# Patient Record
Sex: FEMALE | Race: WHITE | NOT HISPANIC OR LATINO | Employment: FULL TIME | URBAN - METROPOLITAN AREA
[De-identification: names, ages, dates, MRNs, and addresses within clinical notes are randomized per-mention and may not be internally consistent; named-entity substitution may affect disease eponyms.]

---

## 2022-07-06 ENCOUNTER — LAB REQUISITION (OUTPATIENT)
Dept: LAB | Facility: CLINIC | Age: 42
End: 2022-07-06

## 2022-07-06 LAB
VZV IGG SER QL IA: 562 INDEX
VZV IGG SER QL IA: POSITIVE

## 2022-07-06 PROCEDURE — 86787 VARICELLA-ZOSTER ANTIBODY: CPT | Performed by: INTERNAL MEDICINE

## 2022-07-06 PROCEDURE — 86481 TB AG RESPONSE T-CELL SUSP: CPT | Performed by: INTERNAL MEDICINE

## 2022-07-07 LAB
GAMMA INTERFERON BACKGROUND BLD IA-ACNC: 0 IU/ML
M TB IFN-G BLD-IMP: POSITIVE
M TB IFN-G CD4+ BCKGRND COR BLD-ACNC: 10 IU/ML
MITOGEN IGNF BCKGRD COR BLD-ACNC: 0.39 IU/ML
MITOGEN IGNF BCKGRD COR BLD-ACNC: 0.5 IU/ML
QUANTIFERON MITOGEN: 10 IU/ML
QUANTIFERON NIL TUBE: 0 IU/ML
QUANTIFERON TB1 TUBE: 0.5 IU/ML
QUANTIFERON TB2 TUBE: 0.39

## 2022-07-11 ENCOUNTER — HOSPITAL ENCOUNTER (OUTPATIENT)
Dept: GENERAL RADIOLOGY | Facility: CLINIC | Age: 42
Discharge: HOME | End: 2022-07-11
Attending: INTERNAL MEDICINE

## 2022-07-11 DIAGNOSIS — R76.12 POSITIVE QUANTIFERON-TB GOLD TEST: ICD-10-CM

## 2022-07-11 PROCEDURE — 999N000028 XR CHEST 1 VIEW, EMPLOYEE HEALTH

## 2023-01-25 ENCOUNTER — OFFICE VISIT (OUTPATIENT)
Dept: INTERNAL MEDICINE | Facility: CLINIC | Age: 43
End: 2023-01-25
Payer: COMMERCIAL

## 2023-01-25 VITALS
SYSTOLIC BLOOD PRESSURE: 124 MMHG | DIASTOLIC BLOOD PRESSURE: 70 MMHG | TEMPERATURE: 97.3 F | HEART RATE: 80 BPM | BODY MASS INDEX: 22.4 KG/M2 | OXYGEN SATURATION: 97 % | HEIGHT: 71 IN | WEIGHT: 160 LBS

## 2023-01-25 DIAGNOSIS — Z11.59 NEED FOR HEPATITIS C SCREENING TEST: ICD-10-CM

## 2023-01-25 DIAGNOSIS — Z11.4 SCREENING FOR HIV (HUMAN IMMUNODEFICIENCY VIRUS): ICD-10-CM

## 2023-01-25 DIAGNOSIS — G43.109 MIGRAINE WITH AURA AND WITHOUT STATUS MIGRAINOSUS, NOT INTRACTABLE: Primary | ICD-10-CM

## 2023-01-25 DIAGNOSIS — Z12.4 CERVICAL CANCER SCREENING: ICD-10-CM

## 2023-01-25 PROCEDURE — 99204 OFFICE O/P NEW MOD 45 MIN: CPT

## 2023-01-25 RX ORDER — ACETAMINOPHEN AND CODEINE PHOSPHATE 120; 12 MG/5ML; MG/5ML
0.35 SOLUTION ORAL DAILY
COMMUNITY
Start: 2023-01-25 | End: 2023-02-09

## 2023-01-25 RX ORDER — SUMATRIPTAN 20 MG/1
1 SPRAY NASAL PRN
Qty: 1 EACH | Refills: 3 | Status: SHIPPED | OUTPATIENT
Start: 2023-01-25

## 2023-01-25 RX ORDER — ONDANSETRON 4 MG/1
4 TABLET, ORALLY DISINTEGRATING ORAL EVERY 8 HOURS PRN
Qty: 12 TABLET | Refills: 0 | Status: SHIPPED | OUTPATIENT
Start: 2023-01-25

## 2023-01-25 ASSESSMENT — ENCOUNTER SYMPTOMS: HEADACHES: 1

## 2023-01-25 NOTE — PATIENT INSTRUCTIONS
Follow up with neurology.     Imitrex nasal spray sent.     FMLA is paperwork that I would need from your work.

## 2023-01-25 NOTE — PROGRESS NOTES
"  Assessment & Plan     Migraine with aura and without status migrainosus, not intractable  Patient has migraine with aura for past 20 years per her report.  She denies change to these migraines return.  She is able to tolerate a migraine coming when she has an aura and 15 minutes later she is extremely nauseous and is \"down for the day \".  I recommend intranasal Imitrex as migraines are frequently associated with severe nausea, I also sent prescription for Zofran disintegrating tablets.  She is working to get [facility] paperwork filled out and will send it to our office when able    Patient referred to neurology as well  - Adult Neurology  Referral; Future  - ondansetron (ZOFRAN ODT) 4 MG ODT tab; Take 1 tablet (4 mg) by mouth every 8 hours as needed for nausea (nausea)  - SUMAtriptan (IMITREX) 20 MG/ACT nasal spray; Spray 1 spray in nostril as needed for migraine May repeat in 2 hours. Max 2 sprays/24 hours.    Return in about 2 weeks (around 2/8/2023) for Routine preventive.    Basilio Gomes NP  [facility] ALLAN Hills is a 42 year old, presenting for the following health issues:  Establish Care and Headache    Patient  Has  Appointment  For pap with GYN.    Headache     History of Present Illness       Headaches:   Since the patient's last clinic visit, headaches are: worsened  The patient is getting headaches:  About every two months  She is not able to do normal daily activities when she has a migraine.  The patient is taking the following rescue/relief medications:  Tylenol and Excedrin   Patient states \"The relief is inconsistent\" from the rescue/relief medications.   The patient is taking the following medications to prevent migraines:  No medications to prevent migraines  In the past 4 weeks, the patient has gone to an Urgent Care or Emergency Room 0 times times due to headaches.    She eats 2-3 servings of fruits and vegetables daily.She consumes 0 sweetened " "beverage(s) daily.She exercises with enough effort to increase her heart rate 20 to 29 minutes per day.  She exercises with enough effort to increase her heart rate 5 days per week.   She is taking medications regularly.     Migraine are infrequent- (once every two to three months). Depends on how close they take medication. Medication does not necessarily aleve pain but it can help lessen severity. Patients migraines are caused by a trigger migraines. May be stress related.     Has been less frequent since stopping drinking coffee.. Migraines are now coming in clusters- More frequently     Has one every two of three months.     Interested in nasal spray      For work patient is a [employer] typing, talking on the phone, critical thinking, interacting with people     This condition started around May 2000    Review of Systems   Neurological: Positive for headaches.      Constitutional, HEENT, cardiovascular, pulmonary, GI, , musculoskeletal, neuro, skin, endocrine and psych systems are negative, except as otherwise noted.      Objective    /70   Pulse 80   Temp 97.3  F (36.3  C) (Oral)   Ht 1.803 m (5' 11\")   Wt 72.6 kg (160 lb)   LMP 01/11/2023   SpO2 97%   Breastfeeding No   BMI 22.32 kg/m    Body mass index is 22.32 kg/m .  Physical Exam   GENERAL: alert and no distress  EYES: Eyes grossly normal to inspection, PERRL and conjunctivae and sclerae normal  RESP: lungs clear to auscultation - no rales, rhonchi or wheezes  CV: regular rate and rhythm, normal S1 S2, no S3 or S4, no murmur, click or rub, no peripheral edema and peripheral pulses strong  ABDOMEN: soft, nontender, no hepatosplenomegaly, no masses and bowel sounds normal  MS: no gross musculoskeletal defects noted, no edema  SKIN: no suspicious lesions or rashes  NEURO: Normal strength and tone, mentation intact and speech normal  PSYCH: mentation appears normal, affect normal/bright    "

## 2023-02-09 ENCOUNTER — OFFICE VISIT (OUTPATIENT)
Dept: OBGYN | Facility: CLINIC | Age: 43
End: 2023-02-09
Payer: COMMERCIAL

## 2023-02-09 VITALS
DIASTOLIC BLOOD PRESSURE: 72 MMHG | BODY MASS INDEX: 22.34 KG/M2 | WEIGHT: 159.6 LBS | SYSTOLIC BLOOD PRESSURE: 120 MMHG | HEIGHT: 71 IN

## 2023-02-09 DIAGNOSIS — Z01.419 WOMEN'S ANNUAL ROUTINE GYNECOLOGICAL EXAMINATION: Primary | ICD-10-CM

## 2023-02-09 PROCEDURE — 99386 PREV VISIT NEW AGE 40-64: CPT | Performed by: OBSTETRICS & GYNECOLOGY

## 2023-02-09 RX ORDER — ACETAMINOPHEN AND CODEINE PHOSPHATE 120; 12 MG/5ML; MG/5ML
0.35 SOLUTION ORAL DAILY
Qty: 84 TABLET | Refills: 3 | Status: SHIPPED | OUTPATIENT
Start: 2023-02-09 | End: 2024-02-19

## 2023-02-09 NOTE — NURSING NOTE
"Chief Complaint   Patient presents with     Physical     Last pap was 19- NIL with negative HPV. No questions or concerns     Refill Request     On OCP, working well for her. No concerns       Initial /72   Ht 1.803 m (5' 11\")   Wt 72.4 kg (159 lb 9.6 oz)   LMP 2023   BMI 22.26 kg/m   Estimated body mass index is 22.26 kg/m  as calculated from the following:    Height as of this encounter: 1.803 m (5' 11\").    Weight as of this encounter: 72.4 kg (159 lb 9.6 oz).  BP completed using cuff size: regular    Questioned patient about current smoking habits.  Pt. has never smoked.          The following HM Due: mammogram      Joseph Collins CMA                 "

## 2023-02-09 NOTE — PROGRESS NOTES
SUBJECTIVE:                                                      Jeana is a 42 year old No obstetric history on file. female who presents for annual exam.     Patient's last menstrual period was 2023.. Menses are regular q 28-30 days and normal lasting 3-5 days.  Using oral contraceptives for contraception.  She is not currently considering pregnancy.  Besides routine health maintenance, she has no other health concerns today .  GYNECOLOGIC HISTORY:    History sexually transmitted infections:No STD history    History of abnormal Pap smear: NO - age 30-65 PAP every 5 years with negative HPV co-testing recommended  Family history of breast CA: No  Family history of uterine/ovarian CA: No    Family history of colon CA: Yes (Please explain): mat GF, 70s, surgery but no further treatment needed      HISTORY:  OB History    Para Term  AB Living   0 0 0 0 0 0   SAB IAB Ectopic Multiple Live Births   0 0 0 0 0     Past Medical History:   Diagnosis Date     Migraine with aura      Past Surgical History:   Procedure Laterality Date     NO HISTORY OF SURGERY       Family History   Problem Relation Age of Onset     Hypertension Mother      Thyroid nodules Mother      Hypertension Father      Cerebrovascular Disease Maternal Grandmother      Colon Cancer Maternal Grandfather      Diabetes Paternal Grandmother      Cerebrovascular Disease Paternal Grandfather      No Known Problems Brother      Social History     Socioeconomic History     Marital status: Single   Tobacco Use     Smoking status: Never     Smokeless tobacco: Never   Vaping Use     Vaping Use: Never used   Substance and Sexual Activity     Alcohol use: No       Current Outpatient Medications:      norethindrone (MICRONOR) 0.35 MG tablet, Take 1 tablet (0.35 mg) by mouth daily, Disp: 84 tablet, Rfl: 3     ondansetron (ZOFRAN ODT) 4 MG ODT tab, Take 1 tablet (4 mg) by mouth every 8 hours as needed for nausea (nausea), Disp: 12 tablet, Rfl: 0      "SUMAtriptan (IMITREX) 20 MG/ACT nasal spray, Spray 1 spray in nostril as needed for migraine May repeat in 2 hours. Max 2 sprays/24 hours., Disp: 1 each, Rfl: 3     Allergies   Allergen Reactions     No Known Drug Allergies        Past medical, surgical, social and family history were reviewed and updated in EPIC.    ROS:   Negative other than as noted above.      OBJECTIVE:                                                      EXAM:  /72   Ht 1.803 m (5' 11\")   Wt 72.4 kg (159 lb 9.6 oz)   LMP 01/30/2023   BMI 22.26 kg/m     BMI: Body mass index is 22.26 kg/m .  General: Alert and oriented, no distress.  Psychiatric: Mood and affect within normal limits.  Skin: Warm and dry, no lesions, rashes or discolorations.  Neck: Neck supple. Thyroid palpbably normal in size and without nodularity.  Cardiovascular: Regular rate and rhythm, no murmurs, rubs or gallops.   Lungs:  Clear to auscultation bilaterally, breathing is unlabored.  Breasts:  Symmetric, no skin changes.  No dominant masses bilaterally.  The left nipple is inverted, and she states that this has been the case \"for as long as she can remember.\"  Lymph:  No cervical, supraclavicular, infraclavicular, axillary or inguinal lymphadenopathy palpable.   Abdomen: Soft, nontender, no hepatosplenomegaly, no rebound or guarding, no masses, no hernias.   Vulva:  No external lesions, normal female hair distribution, no inguinal adenopathy.    Urethra:  Midline, non-tender, well supported, no discharge  Vagina:  Well-estrogenized, no abnormal discharge, no lesions  Cervix: nontender  Uterus:  anteverted, smooth contour, without enlargement, mobile, and without tenderness  Ovaries:  No masses appreciated, non-tender, mobile  Rectal Exam: deferred  Musculoskeletal: extremities normal      COUNSELING:   Reviewed preventive health counseling, as reflected in patient instructions       Regular exercise       Healthy diet/nutrition       Contraception   reports that " she has never smoked. She has never used smokeless tobacco.        ASSESSMENT/PLAN:                                                      42 year old female with satisfactory annual exam  (Z01.419) Women's annual routine gynecological examination  (primary encounter diagnosis)  Comment:   Plan: *MA Screening Digital Bilateral, norethindrone         (MICRONOR) 0.35 MG tablet        We discussed that combination oral contraceptives should be avoided given her migraine with aura, and so prescription for progesterone only contraceptives was renewed.  Mammogram ordered, breast exam is normal.  She should follow-up yearly, and Pap smear with HPV is recommended for next year.      Olive Mcfarlane MD

## 2023-02-15 ENCOUNTER — HOSPITAL ENCOUNTER (OUTPATIENT)
Dept: MAMMOGRAPHY | Facility: CLINIC | Age: 43
Discharge: HOME | End: 2023-02-15
Attending: OBSTETRICS & GYNECOLOGY | Admitting: OBSTETRICS & GYNECOLOGY
Payer: COMMERCIAL

## 2023-02-15 DIAGNOSIS — Z01.419 WOMEN'S ANNUAL ROUTINE GYNECOLOGICAL EXAMINATION: ICD-10-CM

## 2023-02-15 PROCEDURE — 77067 SCR MAMMO BI INCL CAD: CPT

## 2023-07-26 ENCOUNTER — TELEPHONE (OUTPATIENT)
Dept: NEUROLOGY | Facility: CLINIC | Age: 43
End: 2023-07-26
Payer: COMMERCIAL

## 2023-07-26 NOTE — TELEPHONE ENCOUNTER
Left message for patient to call and confirm appt for 7-27-23.  Also noted that schedule was adjusted to 10:30 as Dr. Novak was overbooked.

## 2023-07-27 ENCOUNTER — OFFICE VISIT (OUTPATIENT)
Dept: NEUROLOGY | Facility: CLINIC | Age: 43
End: 2023-07-27
Payer: COMMERCIAL

## 2023-07-27 VITALS
SYSTOLIC BLOOD PRESSURE: 124 MMHG | HEART RATE: 79 BPM | BODY MASS INDEX: 23.01 KG/M2 | WEIGHT: 165 LBS | OXYGEN SATURATION: 96 % | DIASTOLIC BLOOD PRESSURE: 80 MMHG

## 2023-07-27 DIAGNOSIS — G43.109 MIGRAINE WITH AURA AND WITHOUT STATUS MIGRAINOSUS, NOT INTRACTABLE: ICD-10-CM

## 2023-07-27 PROCEDURE — 99204 OFFICE O/P NEW MOD 45 MIN: CPT | Performed by: PSYCHIATRY & NEUROLOGY

## 2023-07-27 ASSESSMENT — HEADACHE IMPACT TEST (HIT 6)
HIT6 TOTAL SCORE: 63
HOW OFTEN DO HEADACHES LIMIT YOUR DAILY ACTIVITIES: SOMETIMES
HOW OFTEN HAVE YOU FELT TOO TIRED TO WORK BECAUSE OF YOUR HEADACHES: SOMETIMES
HOW OFTEN DID HEADACHS LIMIT CONCENTRATION ON WORK OR DAILY ACTIVITY: SOMETIMES
WHEN YOU HAVE A HEADACHE HOW OFTEN DO YOU WISH YOU COULD LIE DOWN: ALWAYS
HOW OFTEN HAVE YOU FELT FED UP OR IRRITATED BECAUSE OF YOUR HEADACHES: SOMETIMES
WHEN YOU HAVE HEADACHES HOW OFTEN IS THE PAIN SEVERE: SOMETIMES

## 2023-07-27 NOTE — PROGRESS NOTES
[facility]    Jeana Carrillo MRN# [id number]   Age: 43 year old YOB: 1980     Requesting physician: Basilio Gomes  No Ref-Primary, Physician            Assessment and Plan:   Assessment:   Classic Migraine - not intractable     Plan:   MRI brain as all of her headaches are in the same unique location and need to r/o vascular anomaly  Proceed with trial of Imitrex nasal spray at onset and Zofran  RTC 9/21 for review of imaging and medication response    Jeana describes classic migraine.  Since all of her episodes are uniquely in the same location, there is a remote possibility that there is some vascular intracranial pathology mimicking migraine which will be reviewed with MR imaging.  I will meet with her to disclose and review the study per her request.  I believe that the nasal Imitrex and Zofran as recommended by Mr. Kuhn is appropriate.  She has not had a chance since this was prescribed to try it as she did not have the medicine with her for the only headache she did have between then and now.  She will call for any other concerns.             History of Present Illness:   CC:  I met with Jeana today for 45 minutes to review her issues with migraine.  They have been going on since calendar year 2020 and over that span she has suffered perhaps several migraine events per year.  They are classical migraine preceded by short prodrome of feeling some disruption in her mood and activities for several hours followed by visual aura of flashing lights for 10 to 15 minutes and then followed by severe right-sided unilateral pounding headache associated with nausea, vomiting, photophobia and sonophobia.  She is unable to obtain any relief from the severe headache with over-the-counter agents and has resorted to trying to get some sleep at which time the headache may chad but she is left with some post headache residuals of fatigue.  She has modified her diet and is limited to the  only offender she could find that being coffee with some limited success.  She has been taking magnesium supplements without benefit and because of the severe nausea and vomiting she has been unable to hold on any vasoconstrictors such as Imitrex tablets.  She has converted her oral contraceptives to low estrogen again without benefit.  She has been unable to identify any triggers to her migraine other than coffee and has not been able to find any remedies.  She has never had imaging done.    Between headaches she is symptom-free.  Her neurologic review of systems is completely noncontributory and her medical health is excellent.    She describes himself as a type A individual and finds that high stress contributes to her headache not that she has difficulties during the stress but after the stress he will emerge.  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  She exercises regularly, and drinks only occasionally.               Physical Exam:   The neurological examination is completely normal upon review of her mental status, cranial nerve, motor, sensory, coordination station gait, and reflexes.  The fundi are benign.  She has no venous pulsations, AV nicking, and no hemorrhages.  The discs are sharp.         Data:   All laboratory data reviewed  All imaging studies reviewed by me             DATA for DOCUMENTATION:         Past Medical History:     Patient Active Problem List   Diagnosis    CARDIOVASCULAR SCREENING; LDL GOAL LESS THAN 160     Past Medical History:   Diagnosis Date    Migraine with aura        Also see scanned health assessment forms.       Past Surgical History:     Past Surgical History:   Procedure Laterality Date    NO HISTORY OF SURGERY              Social History:     Social History     Socioeconomic History    Marital status: Single     Spouse name: Not on file    Number of children: Not on file    Years of education: Not on file    Highest  education level: Not on file   Occupational History    Not on file   Tobacco Use    Smoking status: Never    Smokeless tobacco: Never   Vaping Use    Vaping Use: Never used   Substance and Sexual Activity    Alcohol use: No    Drug use: Never    Sexual activity: Yes     Partners: Male     Birth control/protection: Pill   Other Topics Concern    Not on file   Social History Narrative    Not on file     Social Determinants of Health     Financial Resource Strain: Not on file   Food Insecurity: Not on file   Transportation Needs: Not on file   Physical Activity: Not on file   Stress: Not on file   Social Connections: Not on file   Intimate Partner Violence: Not on file   Housing Stability: Not on file              Family History:     Family History   Problem Relation Age of Onset    Hypertension Mother     Thyroid nodules Mother     Hypertension Father     Cerebrovascular Disease Maternal Grandmother     Colon Cancer Maternal Grandfather     Diabetes Paternal Grandmother     Cerebrovascular Disease Paternal Grandfather     No Known Problems Brother             Medications:     Current Outpatient Medications   Medication Sig    norethindrone (MICRONOR) 0.35 MG tablet Take 1 tablet (0.35 mg) by mouth daily    ondansetron (ZOFRAN ODT) 4 MG ODT tab Take 1 tablet (4 mg) by mouth every 8 hours as needed for nausea (nausea)    SUMAtriptan (IMITREX) 20 MG/ACT nasal spray Spray 1 spray in nostril as needed for migraine May repeat in 2 hours. Max 2 sprays/24 hours.     No current facility-administered medications for this visit.              Review of Systems:   A comprehensive 10 point review of systems (constitutional, ENT, cardiac, peripheral vascular, lymphatic, respiratory, GI, , Musculoskeletal, skin, Neurological) was performed and found to be negative except as described in this note.     See intake form completed by patient

## 2023-07-27 NOTE — NURSING NOTE
"Jeana Carrillo is a 43 year old female who presents for:  Chief Complaint   Patient presents with    Referral     Migraine        Initial Vitals:  /80   Pulse 79   Wt 74.8 kg (165 lb)   SpO2 96%   BMI 23.01 kg/m   Estimated body mass index is 23.01 kg/m  as calculated from the following:    Height as of 2/9/23: 1.803 m (5' 11\").    Weight as of this encounter: 74.8 kg (165 lb).. Body surface area is 1.94 meters squared. BP completed using cuff size: dakota Colunga    "

## 2023-07-27 NOTE — LETTER
7/27/2023         RE: Jeana Carrillo  [address] [facility] [address]        Dear Colleague,    Thank you for referring your patient, Jeana Carrillo, to the [facility]. Please see a copy of my visit note below.        [facility]    Jeana Carrillo MRN# [id number]   Age: 43 year old YOB: 1980     Requesting physician: Basilio Gomes  No Ref-Primary, Physician            Assessment and Plan:   Assessment:   Classic Migraine - not intractable     Plan:   MRI brain as all of her headaches are in the same unique location and need to r/o vascular anomaly  Proceed with trial of Imitrex nasal spray at onset and Zofran  RTC 9/21 for review of imaging and medication response    Jeana describes classic migraine.  Since all of her episodes are uniquely in the same location, there is a remote possibility that there is some vascular intracranial pathology mimicking migraine which will be reviewed with MR imaging.  I will meet with her to disclose and review the study per her request.  I believe that the nasal Imitrex and Zofran as recommended by Mr. Kuhn is appropriate.  She has not had a chance since this was prescribed to try it as she did not have the medicine with her for the only headache she did have between then and now.  She will call for any other concerns.             History of Present Illness:   CC:  I met with Jeana today for 45 minutes to review her issues with migraine.  They have been going on since calendar year 2020 and over that span she has suffered perhaps several migraine events per year.  They are classical migraine preceded by short prodrome of feeling some disruption in her mood and activities for several hours followed by visual aura of flashing lights for 10 to 15 minutes and then followed by severe right-sided unilateral pounding headache associated with nausea, vomiting, photophobia and sonophobia.  She is unable to  obtain any relief from the severe headache with over-the-counter agents and has resorted to trying to get some sleep at which time the headache may chad but she is left with some post headache residuals of fatigue.  She has modified her diet and is limited to the only offender she could find that being coffee with some limited success.  She has been taking magnesium supplements without benefit and because of the severe nausea and vomiting she has been unable to hold on any vasoconstrictors such as Imitrex tablets.  She has converted her oral contraceptives to low estrogen again without benefit.  She has been unable to identify any triggers to her migraine other than coffee and has not been able to find any remedies.  She has never had imaging done.    Between headaches she is symptom-free.  Her neurologic review of systems is completely noncontributory and her medical health is excellent.    She describes himself as a type A individual and finds that high stress contributes to her headache not that she has difficulties during the stress but after the stress he will emerge.  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  She exercises regularly, and drinks only occasionally.               Physical Exam:   The neurological examination is completely normal upon review of her mental status, cranial nerve, motor, sensory, coordination station gait, and reflexes.  The fundi are benign.  She has no venous pulsations, AV nicking, and no hemorrhages.  The discs are sharp.         Data:   All laboratory data reviewed  All imaging studies reviewed by me             DATA for DOCUMENTATION:         Past Medical History:     Patient Active Problem List   Diagnosis     CARDIOVASCULAR SCREENING; LDL GOAL LESS THAN 160     Past Medical History:   Diagnosis Date     Migraine with aura        Also see scanned health assessment forms.       Past Surgical History:     Past Surgical  History:   Procedure Laterality Date     NO HISTORY OF SURGERY              Social History:     Social History     Socioeconomic History     Marital status: Single     Spouse name: Not on file     Number of children: Not on file     Years of education: Not on file     Highest education level: Not on file   Occupational History     Not on file   Tobacco Use     Smoking status: Never     Smokeless tobacco: Never   Vaping Use     Vaping Use: Never used   Substance and Sexual Activity     Alcohol use: No     Drug use: Never     Sexual activity: Yes     Partners: Male     Birth control/protection: Pill   Other Topics Concern     Not on file   Social History Narrative     Not on file     Social Determinants of Health     Financial Resource Strain: Not on file   Food Insecurity: Not on file   Transportation Needs: Not on file   Physical Activity: Not on file   Stress: Not on file   Social Connections: Not on file   Intimate Partner Violence: Not on file   Housing Stability: Not on file              Family History:     Family History   Problem Relation Age of Onset     Hypertension Mother      Thyroid nodules Mother      Hypertension Father      Cerebrovascular Disease Maternal Grandmother      Colon Cancer Maternal Grandfather      Diabetes Paternal Grandmother      Cerebrovascular Disease Paternal Grandfather      No Known Problems Brother             Medications:     Current Outpatient Medications   Medication Sig     norethindrone (MICRONOR) 0.35 MG tablet Take 1 tablet (0.35 mg) by mouth daily     ondansetron (ZOFRAN ODT) 4 MG ODT tab Take 1 tablet (4 mg) by mouth every 8 hours as needed for nausea (nausea)     SUMAtriptan (IMITREX) 20 MG/ACT nasal spray Spray 1 spray in nostril as needed for migraine May repeat in 2 hours. Max 2 sprays/24 hours.     No current facility-administered medications for this visit.              Review of Systems:   A comprehensive 10 point review of systems (constitutional, ENT, cardiac,  peripheral vascular, lymphatic, respiratory, GI, , Musculoskeletal, skin, Neurological) was performed and found to be negative except as described in this note.     See intake form completed by patient    Again, thank you for allowing me to participate in the care of your patient.        Sincerely,        Jacinto Novak MD, MD

## 2023-08-02 ENCOUNTER — HOSPITAL ENCOUNTER (OUTPATIENT)
Dept: MRI IMAGING | Facility: CLINIC | Age: 43
Discharge: HOME | End: 2023-08-02
Attending: PSYCHIATRY & NEUROLOGY | Admitting: PSYCHIATRY & NEUROLOGY
Payer: COMMERCIAL

## 2023-08-02 DIAGNOSIS — G43.109 MIGRAINE WITH AURA AND WITHOUT STATUS MIGRAINOSUS, NOT INTRACTABLE: ICD-10-CM

## 2023-08-02 PROCEDURE — 70551 MRI BRAIN STEM W/O DYE: CPT

## 2023-09-20 ENCOUNTER — TELEPHONE (OUTPATIENT)
Dept: NEUROLOGY | Facility: CLINIC | Age: 43
End: 2023-09-20
Payer: COMMERCIAL

## 2023-09-21 ENCOUNTER — OFFICE VISIT (OUTPATIENT)
Dept: NEUROLOGY | Facility: CLINIC | Age: 43
End: 2023-09-21
Payer: COMMERCIAL

## 2023-09-21 VITALS
DIASTOLIC BLOOD PRESSURE: 75 MMHG | WEIGHT: 163.8 LBS | OXYGEN SATURATION: 98 % | HEART RATE: 67 BPM | BODY MASS INDEX: 22.85 KG/M2 | SYSTOLIC BLOOD PRESSURE: 115 MMHG

## 2023-09-21 DIAGNOSIS — G43.109 MIGRAINE WITH AURA AND WITHOUT STATUS MIGRAINOSUS, NOT INTRACTABLE: Primary | ICD-10-CM

## 2023-09-21 PROCEDURE — 99214 OFFICE O/P EST MOD 30 MIN: CPT | Performed by: PSYCHIATRY & NEUROLOGY

## 2023-09-21 RX ORDER — SUMATRIPTAN 20 MG/1
1 SPRAY NASAL PRN
Qty: 3 EACH | Refills: 11 | Status: SHIPPED | OUTPATIENT
Start: 2023-09-21

## 2023-09-21 ASSESSMENT — HEADACHE IMPACT TEST (HIT 6)
HOW OFTEN HAVE YOU FELT TOO TIRED TO WORK BECAUSE OF YOUR HEADACHES: SOMETIMES
WHEN YOU HAVE A HEADACHE HOW OFTEN DO YOU WISH YOU COULD LIE DOWN: ALWAYS
HIT6 TOTAL SCORE: 65
HOW OFTEN HAVE YOU FELT FED UP OR IRRITATED BECAUSE OF YOUR HEADACHES: SOMETIMES
WHEN YOU HAVE HEADACHES HOW OFTEN IS THE PAIN SEVERE: VERY OFTEN
HOW OFTEN DID HEADACHS LIMIT CONCENTRATION ON WORK OR DAILY ACTIVITY: SOMETIMES
HOW OFTEN DO HEADACHES LIMIT YOUR DAILY ACTIVITIES: VERY OFTEN

## 2023-09-21 NOTE — NURSING NOTE
"Jeana Carrillo is a 43 year old female who presents for:  Chief Complaint   Patient presents with    RECHECK     Headache  Review MRI results        Initial Vitals:  /75   Pulse 67   Wt 74.3 kg (163 lb 12.8 oz)   SpO2 98%   BMI 22.85 kg/m   Estimated body mass index is 22.85 kg/m  as calculated from the following:    Height as of 2/9/23: 1.803 m (5' 11\").    Weight as of this encounter: 74.3 kg (163 lb 12.8 oz).. Body surface area is 1.93 meters squared. BP completed using cuff size: dakota Colunga    "

## 2023-09-21 NOTE — LETTER
9/21/2023         RE: Jeana Carrillo  [address] [facility] [address]        Dear Colleague,    Thank you for referring your patient, Jeana Carrillo, to the [facility]. Please see a copy of my visit note below.        [facility]    Jeana Carrillo MRN# [id number]   Age: 43 year old YOB: 1980     Requesting physician: No ref. provider found  No Ref-Primary, Physician            Assessment and Plan:   Assessment:   Migraine headache disorder  Neck discomfort     Plan:   Continue sumatriptan nasal sprayt prn  RTC 1 year    Jeana continues to do well with her migraine.  It is reassuring to have the sumatriptan and provide her with symptom relief.  I renewed this agent for her for the next 12 months and suggested a neurological follow-up on a routine basis at that time.  Should she have any issues with her cervical spine, a referral to physical therapy could be contemplated.             History of Present Illness:   CC:    I met with Jeana for 20 minutes today to review her migraine headache disorder.  Since her last visit, she did have 1 headache, somewhat different than her previous classic migraine and that it awoke her in the middle of the night without aura, recognizing significant discomfort in both sides of her head and neck as well as discomfort over the temporal regions of the head.  This responded however quite nicely to the Imitrex nasal spray.  She was able to get back to sleep, awoke the following morning with minimal symptoms which ultimately cleared spontaneously.  She has not had any other issues with neck pain nor headaches.    She has undergone MR brain imaging which I reviewed with her today showing no abnormalities although there is a straightening of the normal cervical lordotic curve.    The balance of her neurologic review of systems is noncontributory.  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             Physical Exam:   Not performed         Data:   All laboratory data reviewed  All imaging studies reviewed by me             DATA for DOCUMENTATION:         Past Medical History:     Patient Active Problem List   Diagnosis     CARDIOVASCULAR SCREENING; LDL GOAL LESS THAN 160     Past Medical History:   Diagnosis Date     Migraine with aura        Also see scanned health assessment forms.       Past Surgical History:     Past Surgical History:   Procedure Laterality Date     NO HISTORY OF SURGERY              Social History:     Social History     Socioeconomic History     Marital status: Single     Spouse name: Not on file     Number of children: Not on file     Years of education: Not on file     Highest education level: Not on file   Occupational History     Not on file   Tobacco Use     Smoking status: Never     Smokeless tobacco: Never   Vaping Use     Vaping Use: Never used   Substance and Sexual Activity     Alcohol use: No     Drug use: Never     Sexual activity: Yes     Partners: Male     Birth control/protection: Pill   Other Topics Concern     Not on file   Social History Narrative     Not on file     Social Determinants of Health     Financial Resource Strain: Not on file   Food Insecurity: Not on file   Transportation Needs: Not on file   Physical Activity: Not on file   Stress: Not on file   Social Connections: Not on file   Interpersonal Safety: Not on file   Housing Stability: Not on file              Family History:     Family History   Problem Relation Age of Onset     Hypertension Mother      Thyroid nodules Mother      Hypertension Father      Cerebrovascular Disease Maternal Grandmother      Colon Cancer Maternal Grandfather      Diabetes Paternal Grandmother      Cerebrovascular Disease Paternal Grandfather      No Known Problems Brother             Medications:     Current Outpatient Medications   Medication Sig     norethindrone  (MICRONOR) 0.35 MG tablet Take 1 tablet (0.35 mg) by mouth daily     ondansetron (ZOFRAN ODT) 4 MG ODT tab Take 1 tablet (4 mg) by mouth every 8 hours as needed for nausea (nausea)     SUMAtriptan (IMITREX) 20 MG/ACT nasal spray Spray 1 spray in nostril as needed for migraine May repeat in 2 hours. Max 2 sprays/24 hours.     SUMAtriptan (IMITREX) 20 MG/ACT nasal spray Spray 1 spray in nostril as needed for migraine May repeat in 2 hours. Max 2 sprays/24 hours.     No current facility-administered medications for this visit.              Review of Systems:   A comprehensive 10 point review of systems (constitutional, ENT, cardiac, peripheral vascular, lymphatic, respiratory, GI, , Musculoskeletal, skin, Neurological) was performed and found to be negative except as described in this note.     See intake form completed by patient      Again, thank you for allowing me to participate in the care of your patient.        Sincerely,        Jacinto Novak MD, MD

## 2023-09-21 NOTE — PROGRESS NOTES
[facility]    Jeana Carrillo MRN# [id number]   Age: 43 year old YOB: 1980     Requesting physician: No ref. provider found  No Ref-Primary, Physician            Assessment and Plan:   Assessment:   Migraine headache disorder  Neck discomfort     Plan:   Continue sumatriptan nasal sprayt prn  RTC 1 year    Jeana continues to do well with her migraine.  It is reassuring to have the sumatriptan and provide her with symptom relief.  I renewed this agent for her for the next 12 months and suggested a neurological follow-up on a routine basis at that time.  Should she have any issues with her cervical spine, a referral to physical therapy could be contemplated.             History of Present Illness:   CC:    I met with Jeana for 20 minutes today to review her migraine headache disorder.  Since her last visit, she did have 1 headache, somewhat different than her previous classic migraine and that it awoke her in the middle of the night without aura, recognizing significant discomfort in both sides of her head and neck as well as discomfort over the temporal regions of the head.  This responded however quite nicely to the Imitrex nasal spray.  She was able to get back to sleep, awoke the following morning with minimal symptoms which ultimately cleared spontaneously.  She has not had any other issues with neck pain nor headaches.    She has undergone MR brain imaging which I reviewed with her today showing no abnormalities although there is a straightening of the normal cervical lordotic curve.    The balance of her neurologic review of systems is noncontributory.  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             Physical Exam:   Not performed         Data:   All laboratory data reviewed  All imaging studies reviewed by me             DATA for DOCUMENTATION:         Past Medical History:     Patient Active  Problem List   Diagnosis    CARDIOVASCULAR SCREENING; LDL GOAL LESS THAN 160     Past Medical History:   Diagnosis Date    Migraine with aura        Also see scanned health assessment forms.       Past Surgical History:     Past Surgical History:   Procedure Laterality Date    NO HISTORY OF SURGERY              Social History:     Social History     Socioeconomic History    Marital status: Single     Spouse name: Not on file    Number of children: Not on file    Years of education: Not on file    Highest education level: Not on file   Occupational History    Not on file   Tobacco Use    Smoking status: Never    Smokeless tobacco: Never   Vaping Use    Vaping Use: Never used   Substance and Sexual Activity    Alcohol use: No    Drug use: Never    Sexual activity: Yes     Partners: Male     Birth control/protection: Pill   Other Topics Concern    Not on file   Social History Narrative    Not on file     Social Determinants of Health     Financial Resource Strain: Not on file   Food Insecurity: Not on file   Transportation Needs: Not on file   Physical Activity: Not on file   Stress: Not on file   Social Connections: Not on file   Interpersonal Safety: Not on file   Housing Stability: Not on file              Family History:     Family History   Problem Relation Age of Onset    Hypertension Mother     Thyroid nodules Mother     Hypertension Father     Cerebrovascular Disease Maternal Grandmother     Colon Cancer Maternal Grandfather     Diabetes Paternal Grandmother     Cerebrovascular Disease Paternal Grandfather     No Known Problems Brother             Medications:     Current Outpatient Medications   Medication Sig    norethindrone (MICRONOR) 0.35 MG tablet Take 1 tablet (0.35 mg) by mouth daily    ondansetron (ZOFRAN ODT) 4 MG ODT tab Take 1 tablet (4 mg) by mouth every 8 hours as needed for nausea (nausea)    SUMAtriptan (IMITREX) 20 MG/ACT nasal spray Spray 1 spray in nostril as needed for migraine May repeat in  2 hours. Max 2 sprays/24 hours.    SUMAtriptan (IMITREX) 20 MG/ACT nasal spray Spray 1 spray in nostril as needed for migraine May repeat in 2 hours. Max 2 sprays/24 hours.     No current facility-administered medications for this visit.              Review of Systems:   A comprehensive 10 point review of systems (constitutional, ENT, cardiac, peripheral vascular, lymphatic, respiratory, GI, , Musculoskeletal, skin, Neurological) was performed and found to be negative except as described in this note.     See intake form completed by patient

## 2024-02-18 DIAGNOSIS — Z01.419 WOMEN'S ANNUAL ROUTINE GYNECOLOGICAL EXAMINATION: ICD-10-CM

## 2024-02-19 RX ORDER — ACETAMINOPHEN AND CODEINE PHOSPHATE 120; 12 MG/5ML; MG/5ML
0.35 SOLUTION ORAL DAILY
Qty: 84 TABLET | Refills: 0 | Status: SHIPPED | OUTPATIENT
Start: 2024-02-19 | End: 2024-05-08

## 2024-02-19 NOTE — TELEPHONE ENCOUNTER
Requested Prescriptions   Pending Prescriptions Disp Refills    norethindrone (MICRONOR) 0.35 MG tablet [Pharmacy Med Name: NORETHINDRONE 0.35 MG TABLET] 84 tablet 3     Sig: TAKE 1 TABLET BY MOUTH EVERY DAY       Contraceptives Protocol Failed - 2/18/2024  7:04 AM        Failed - Recent (12 mo) or future (30 days) visit within the authorizing provider's specialty     The patient must have completed an in-person or virtual visit within the past 12 months or has a future visit scheduled within the next 90 days with the authorizing provider s specialty.  Urgent care and e-visits do not quality as an office visit for this protocol.          Passed - Patient is not a current smoker if age is 35 or older        Passed - Medication is active on med list        Passed - No active pregnancy on record        Passed - No positive pregnancy test in past 12 months           Due for appt. Sent 3 months and message to get scheduled.

## 2024-05-06 DIAGNOSIS — Z01.419 WOMEN'S ANNUAL ROUTINE GYNECOLOGICAL EXAMINATION: ICD-10-CM

## 2024-05-06 NOTE — TELEPHONE ENCOUNTER
Pt is overdue for her annual exam.    A my chart message was previously sent to her.    I sent another my chart message to her.    Once she makes an appt, we can send in a refill.      Hannah MUNGUIA RN

## 2024-05-08 RX ORDER — ACETAMINOPHEN AND CODEINE PHOSPHATE 120; 12 MG/5ML; MG/5ML
0.35 SOLUTION ORAL DAILY
Qty: 28 TABLET | Refills: 2 | Status: SHIPPED | OUTPATIENT
Start: 2024-05-08

## 2024-05-08 NOTE — TELEPHONE ENCOUNTER
Left message to call back on voicemail on cell phone.  Pt has not read the my chart message that was previously sent to her.    ELIZABETH Zacarias RN

## 2024-05-08 NOTE — TELEPHONE ENCOUNTER
Pt advised.   Transferred to the  to schedule her annual exam.     Refill sent in.    Hannah MUNGUIA RN

## 2024-06-21 ENCOUNTER — OFFICE VISIT (OUTPATIENT)
Dept: OBGYN | Facility: CLINIC | Age: 44
End: 2024-06-21
Payer: COMMERCIAL

## 2024-06-21 VITALS
WEIGHT: 173 LBS | HEIGHT: 71 IN | DIASTOLIC BLOOD PRESSURE: 66 MMHG | SYSTOLIC BLOOD PRESSURE: 118 MMHG | BODY MASS INDEX: 24.22 KG/M2

## 2024-06-21 DIAGNOSIS — Z01.419 WOMEN'S ANNUAL ROUTINE GYNECOLOGICAL EXAMINATION: Primary | ICD-10-CM

## 2024-06-21 PROCEDURE — 87624 HPV HI-RISK TYP POOLED RSLT: CPT | Performed by: OBSTETRICS & GYNECOLOGY

## 2024-06-21 PROCEDURE — 99396 PREV VISIT EST AGE 40-64: CPT | Performed by: OBSTETRICS & GYNECOLOGY

## 2024-06-21 PROCEDURE — 99459 PELVIC EXAMINATION: CPT | Performed by: OBSTETRICS & GYNECOLOGY

## 2024-06-21 PROCEDURE — G0145 SCR C/V CYTO,THINLAYER,RESCR: HCPCS | Performed by: OBSTETRICS & GYNECOLOGY

## 2024-06-21 RX ORDER — ACETAMINOPHEN AND CODEINE PHOSPHATE 120; 12 MG/5ML; MG/5ML
0.35 SOLUTION ORAL DAILY
Qty: 90 TABLET | Refills: 3 | Status: SHIPPED | OUTPATIENT
Start: 2024-06-21

## 2024-06-21 NOTE — NURSING NOTE
"Chief Complaint   Patient presents with    Physical       Initial /66   Ht 1.803 m (5' 11\")   Wt 78.5 kg (173 lb)   LMP 2024 (Exact Date)   BMI 24.13 kg/m   Estimated body mass index is 24.13 kg/m  as calculated from the following:    Height as of this encounter: 1.803 m (5' 11\").    Weight as of this encounter: 78.5 kg (173 lb).  BP completed using cuff size: regular    Questioned patient about current smoking habits.  Pt. has never smoked.          The following HM Due: pap      Donna Amos LPN on 2024 at 3:43 PM             "

## 2024-06-21 NOTE — PROGRESS NOTES
"Jeana is a 44 year old  female who presents for annual exam.     Besides routine health maintenance,     HPI:  The patient's PCP is  Physician No Ref-Primary. none      GYNECOLOGIC HISTORY:    Patient's last menstrual period was 2024 (exact date).    Regular menses? yes  Menses every 28 days.  Length of menses: 4 days    Her current contraception method is: oral contraceptives.  She  reports that she has never smoked. She has never used smokeless tobacco.    Patient is sexually active.  STD testing offered?  Declined  Last PHQ-9 score on record =        No data to display              Last GAD7 score on record =        No data to display                HEALTH MAINTENANCE:  Cholesterol: (No results found for: \"CHOL\"   Last Mammo: One year ago, Result: Normal, Next Mammo:   Pap: (No results found for: \"GYNINTERP\", \"PAP\" )        Health maintenance updated:  yes    HISTORY:  OB History    Para Term  AB Living   0 0 0 0 0 0   SAB IAB Ectopic Multiple Live Births   0 0 0 0 0       Patient Active Problem List   Diagnosis    CARDIOVASCULAR SCREENING; LDL GOAL LESS THAN 160     Past Surgical History:   Procedure Laterality Date    NO HISTORY OF SURGERY        Social History     Tobacco Use    Smoking status: Never    Smokeless tobacco: Never   Substance Use Topics    Alcohol use: No      Problem (# of Occurrences) Relation (Name,Age of Onset)    Diabetes (1) Paternal Grandmother    Hypertension (2) Mother, Father    Cerebrovascular Disease (2) Maternal Grandmother, Paternal Grandfather    Thyroid nodules (1) Mother    Colon Cancer (1) Maternal Grandfather    No Known Problems (1) Brother              Current Outpatient Medications   Medication Sig Dispense Refill    norethindrone (MICRONOR) 0.35 MG tablet TAKE 1 TABLET BY MOUTH EVERY DAY 28 tablet 2    ondansetron (ZOFRAN ODT) 4 MG ODT tab Take 1 tablet (4 mg) by mouth every 8 hours as needed for nausea (nausea) (Patient not taking: Reported on " "6/21/2024) 12 tablet 0    SUMAtriptan (IMITREX) 20 MG/ACT nasal spray Spray 1 spray in nostril as needed for migraine May repeat in 2 hours. Max 2 sprays/24 hours. (Patient not taking: Reported on 6/21/2024) 3 each 11    SUMAtriptan (IMITREX) 20 MG/ACT nasal spray Spray 1 spray in nostril as needed for migraine May repeat in 2 hours. Max 2 sprays/24 hours. (Patient not taking: Reported on 6/21/2024) 1 each 3     No current facility-administered medications for this visit.     Allergies   Allergen Reactions    No Known Drug Allergy        Past medical, surgical, social and family histories were reviewed and updated in EPIC.    ROS:   12 point review of systems negative other than symptoms noted below or in the HPI.      EXAM:  /66   Ht 1.803 m (5' 11\")   Wt 78.5 kg (173 lb)   LMP 05/17/2024 (Exact Date)   BMI 24.13 kg/m     BMI: Body mass index is 24.13 kg/m .    PHYSICAL EXAM:  Constitutional:   Appearance: Well nourished, well developed, alert, in no acute distress  Breasts: Deferred.  Skin:  General Inspection:  No rashes present, no lesions present, no areas of  discoloration  Neurologic:    Mental Status:  Oriented X3.  Normal strength and tone, sensory exam                grossly normal, mentation intact and speech normal.    Psychiatric:   Mentation appears normal and affect normal/bright.         Pelvic Exam:  External Genitalia:     Normal appearance for age, no discharge present, no tenderness present, no inflammatory lesions present, color normal  Vagina:     Normal vaginal vault without central or paravaginal defects, no discharge present, no inflammatory lesions present, no masses present  Bladder:     Nontender to palpation  Urethra:   Urethral Body:  Urethra palpation normal, urethra structural support normal   Urethral Meatus:  No erythema or lesions present  Cervix:     Appearance healthy, no lesions present, nontender to palpation, no bleeding present  Uterus:     Uterus: firm, normal " sized and nontender, retroverted in position.   Adnexa:     No adnexal tenderness present, no adnexal masses present  Perineum:     Perineum within normal limits, no evidence of trauma, no rashes or skin lesions present  Anus:     Anus within normal limits, no hemorrhoids present  Inguinal Lymph Nodes:     No lymphadenopathy present  Pubic Hair:     Normal pubic hair distribution for age  Genitalia and Groin:     No rashes present, no lesions present, no areas of discoloration, no masses present    COUNSELING:   Reviewed preventive health counseling, as reflected in patient instructions    BMI: Body mass index is 24.13 kg/m .      ASSESSMENT:  44 year old female with satisfactory annual exam.  No concerns for GYN issues or testing for STD    PLAN:  Pap smear and refill OCP    Arsalan Nicolas MD

## 2024-06-24 LAB
HPV HR 12 DNA CVX QL NAA+PROBE: NEGATIVE
HPV16 DNA CVX QL NAA+PROBE: NEGATIVE
HPV18 DNA CVX QL NAA+PROBE: NEGATIVE
HUMAN PAPILLOMA VIRUS FINAL DIAGNOSIS: NORMAL

## 2024-06-28 LAB
BKR LAB AP GYN ADEQUACY: NORMAL
BKR LAB AP GYN INTERPRETATION: NORMAL
BKR LAB AP LMP: NORMAL
BKR LAB AP PREVIOUS ABNORMAL: NORMAL
PATH REPORT.COMMENTS IMP SPEC: NORMAL
PATH REPORT.COMMENTS IMP SPEC: NORMAL
PATH REPORT.RELEVANT HX SPEC: NORMAL

## 2025-05-10 ENCOUNTER — HEALTH MAINTENANCE LETTER (OUTPATIENT)
Age: 45
End: 2025-05-10

## 2025-06-12 ENCOUNTER — OFFICE VISIT (OUTPATIENT)
Dept: INTERNAL MEDICINE | Facility: CLINIC | Age: 45
End: 2025-06-12
Payer: COMMERCIAL

## 2025-06-12 VITALS
DIASTOLIC BLOOD PRESSURE: 59 MMHG | SYSTOLIC BLOOD PRESSURE: 110 MMHG | HEART RATE: 80 BPM | RESPIRATION RATE: 16 BRPM | TEMPERATURE: 98 F | WEIGHT: 17.1 LBS | OXYGEN SATURATION: 95 % | HEIGHT: 72 IN | BODY MASS INDEX: 2.32 KG/M2

## 2025-06-12 DIAGNOSIS — Z13.0 SCREENING, IRON DEFICIENCY ANEMIA: ICD-10-CM

## 2025-06-12 DIAGNOSIS — Z13.1 SCREENING FOR DIABETES MELLITUS: Primary | ICD-10-CM

## 2025-06-12 DIAGNOSIS — Z13.228 SCREENING FOR METABOLIC DISORDER: ICD-10-CM

## 2025-06-12 DIAGNOSIS — Z13.220 LIPID SCREENING: ICD-10-CM

## 2025-06-12 DIAGNOSIS — Z12.11 SCREEN FOR COLON CANCER: ICD-10-CM

## 2025-06-12 SDOH — HEALTH STABILITY: PHYSICAL HEALTH: ON AVERAGE, HOW MANY DAYS PER WEEK DO YOU ENGAGE IN MODERATE TO STRENUOUS EXERCISE (LIKE A BRISK WALK)?: 4 DAYS

## 2025-06-12 SDOH — HEALTH STABILITY: PHYSICAL HEALTH: ON AVERAGE, HOW MANY MINUTES DO YOU ENGAGE IN EXERCISE AT THIS LEVEL?: 50 MIN

## 2025-06-12 ASSESSMENT — SOCIAL DETERMINANTS OF HEALTH (SDOH): HOW OFTEN DO YOU GET TOGETHER WITH FRIENDS OR RELATIVES?: PATIENT DECLINED

## 2025-06-12 NOTE — PROGRESS NOTES
Preventive Care Visit  [facility]  Sawyer Burrows MD, Internal Medicine  Jun 12, 2025    Assessment & Plan     (Z12.11) Screen for colon cancer  - Agreeable to procedure  Plan: Colonoscopy Screening  Referral    (Z13.1) Screening for diabetes mellitus  (primary encounter diagnosis)  Plan: Hemoglobin A1c    (Z13.0) Screening, iron deficiency anemia  - No bleeding complaints  Plan: CBC with platelets    (Z13.228) Screening for metabolic disorder  Plan: Basic metabolic panel  (Ca, Cl, CO2, Creat,         Gluc, K, Na, BUN)    (Z13.220) Lipid screening  Plan: Lipid panel reflex to direct LDL Fasting        Counseling  Appropriate preventive services were addressed with this patient via screening, questionnaire, or discussion as appropriate for fall prevention, nutrition, physical activity, Tobacco-use cessation, social engagement, weight loss and cognition.  Checklist reviewing preventive services available has been given to the patient.  Reviewed patient's diet, addressing concerns and/or questions.       Trupti Hills is a 45 year old, presenting for the following:  Physical (Non fasting.)        6/12/2025     2:35 PM   Additional Questions   Roomed by LENI Pathak   Accompanied by Self        Via the Health Maintenance questionnaire, the patient has reported the following services have been completed -Mammogram: [facility] 2023-08-30, this information has not been sent to the abstraction team.      Advance Care Planning  Discussed advance care planning with patient; informed AVS has link to [facility].        6/12/2025   General Health   How would you rate your overall physical health? Good   Feel stress (tense, anxious, or unable to sleep) Not at all         6/12/2025   Nutrition   Three or more servings of calcium each day? (!) I DON'T KNOW   Diet: Regular (no restrictions)   How many servings of fruit and vegetables per day? (!) 2-3   How many sweetened beverages each day?  0-1         6/12/2025   Exercise   Days per week of moderate/strenous exercise 4 days   Average minutes spent exercising at this level 50 min         6/12/2025   Social Factors   Frequency of gathering with friends or relatives Patient declined   Worry food won't last until get money to buy more No   Food not last or not have enough money for food? No   Do you have housing? (Housing is defined as stable permanent housing and does not include staying outside in a car, in a tent, in an abandoned building, in an overnight shelter, or couch-surfing.) Yes   Are you worried about losing your housing? No   Lack of transportation? No   Unable to get utilities (heat,electricity)? No         6/12/2025   Dental   Dentist two times every year? Yes         Today's PHQ-2 Score:       6/12/2025     1:56 PM   PHQ-2 ( 1999 [employer])   Q1: Little interest or pleasure in doing things 0   Q2: Feeling down, depressed or hopeless 0   PHQ-2 Score 0    Q1: Little interest or pleasure in doing things Not at all   Q2: Feeling down, depressed or hopeless Not at all   PHQ-2 Score 0       Patient-reported           6/12/2025   Substance Use   Alcohol more than 3/day or more than 7/wk No   Do you use any other substances recreationally? No     Social History     Tobacco Use    Smoking status: Never    Smokeless tobacco: Never   Vaping Use    Vaping status: Never Used   Substance Use Topics    Alcohol use: Yes    Drug use: Never           2/15/2023   LAST FHS-7 RESULTS   1st degree relative breast or ovarian cancer No   Any relative bilateral breast cancer No   Any male have breast cancer No   Any ONE woman have BOTH breast AND ovarian cancer No   Any woman with breast cancer before 50yrs No   2 or more relatives with breast AND/OR ovarian cancer No   2 or more relatives with breast AND/OR bowel cancer No       Mammogram Screening - Mammogram every 1-2 years updated in Health Maintenance based on mutual decision making        6/12/2025   STI  "Screening   New sexual partner(s) since last STI/HIV test? No     History of abnormal Pap smear: No - age 30- 64 PAP with HPV every 5 years recommended        Latest Ref Rng & Units 6/21/2024     4:12 PM   PAP / HPV   PAP  Negative for Intraepithelial Lesion or Malignancy (NILM)    HPV 16 DNA Negative Negative    HPV 18 DNA Negative Negative    Other HR HPV Negative Negative      ASCVD Risk   The ASCVD Risk score (Darrell RAHMAN, et al., 2019) failed to calculate for the following reasons:    Cannot find a previous HDL lab    Cannot find a previous total cholesterol lab        6/12/2025   Contraception/Family Planning   Questions about contraception or family planning (!) YES        Past Medical History:   Diagnosis Date    Migraine with aura      Past Surgical History:   Procedure Laterality Date    NO HISTORY OF SURGERY           Review of Systems  Constitutional, neuro, ENT, endocrine, pulmonary, cardiac, gastrointestinal, genitourinary, musculoskeletal, integument and psychiatric systems are negative, except as otherwise noted.     Objective    Exam  /59 (BP Location: Left arm, Patient Position: Sitting, Cuff Size: Adult Regular)   Pulse 80   Temp 98  F (36.7  C) (Oral)   Resp 16   Ht 1.816 m (5' 11.5\")   Wt 7.757 kg (17 lb 1.6 oz)   LMP 05/20/2025 (Approximate)   SpO2 95%   BMI 2.35 kg/m     Estimated body mass index is 2.35 kg/m  as calculated from the following:    Height as of this encounter: 1.816 m (5' 11.5\").    Weight as of this encounter: 7.757 kg (17 lb 1.6 oz).    Physical Exam  Vitals reviewed.   Constitutional:       Appearance: Normal appearance.   HENT:      Head: Atraumatic.   Eyes:      Extraocular Movements: Extraocular movements intact.   Cardiovascular:      Rate and Rhythm: Normal rate and regular rhythm.   Pulmonary:      Breath sounds: Normal breath sounds.   Abdominal:      Palpations: Abdomen is soft.   Musculoskeletal:         General: Normal range of motion.      " Cervical back: Normal range of motion.   Skin:     General: Skin is warm.   Neurological:      General: No focal deficit present.   Psychiatric:         Mood and Affect: Mood normal.           Signed Electronically by: Sawyer Burrows MD

## 2025-06-16 ENCOUNTER — RESULTS FOLLOW-UP (OUTPATIENT)
Dept: INTERNAL MEDICINE | Facility: CLINIC | Age: 45
End: 2025-06-16

## 2025-06-16 ENCOUNTER — HOSPITAL ENCOUNTER (OUTPATIENT)
Dept: MAMMOGRAPHY | Facility: CLINIC | Age: 45
Discharge: HOME | End: 2025-06-16
Admitting: OBSTETRICS & GYNECOLOGY
Payer: COMMERCIAL

## 2025-06-16 DIAGNOSIS — Z01.419 WOMEN'S ANNUAL ROUTINE GYNECOLOGICAL EXAMINATION: ICD-10-CM

## 2025-06-16 DIAGNOSIS — Z12.31 VISIT FOR SCREENING MAMMOGRAM: ICD-10-CM

## 2025-06-16 PROCEDURE — 77063 BREAST TOMOSYNTHESIS BI: CPT

## 2025-06-16 RX ORDER — ACETAMINOPHEN AND CODEINE PHOSPHATE 120; 12 MG/5ML; MG/5ML
0.35 SOLUTION ORAL DAILY
Qty: 90 TABLET | Refills: 0 | Status: SHIPPED | OUTPATIENT
Start: 2025-06-16

## 2025-06-16 NOTE — TELEPHONE ENCOUNTER
Jencycla 0.35 mg tablet      Last Written Prescription Date:  6/21/2024 for Micronor  Last Fill Quantity: 90,   # refills: 3  Last Office Visit: 6/21/2024 with Dr. JACK Nicolas  Future Office visit:   7/30/2025 with Dr. Kvng Hebert CMA

## 2025-07-30 ENCOUNTER — OFFICE VISIT (OUTPATIENT)
Dept: OBGYN | Facility: CLINIC | Age: 45
End: 2025-07-30
Payer: COMMERCIAL

## 2025-07-30 VITALS
WEIGHT: 175 LBS | DIASTOLIC BLOOD PRESSURE: 66 MMHG | HEIGHT: 72 IN | SYSTOLIC BLOOD PRESSURE: 116 MMHG | BODY MASS INDEX: 23.7 KG/M2

## 2025-07-30 DIAGNOSIS — Z01.419 WOMEN'S ANNUAL ROUTINE GYNECOLOGICAL EXAMINATION: ICD-10-CM

## 2025-07-30 PROCEDURE — 99396 PREV VISIT EST AGE 40-64: CPT | Performed by: OBSTETRICS & GYNECOLOGY

## 2025-07-30 PROCEDURE — 3078F DIAST BP <80 MM HG: CPT | Performed by: OBSTETRICS & GYNECOLOGY

## 2025-07-30 PROCEDURE — 3074F SYST BP LT 130 MM HG: CPT | Performed by: OBSTETRICS & GYNECOLOGY

## 2025-07-30 RX ORDER — ACETAMINOPHEN AND CODEINE PHOSPHATE 120; 12 MG/5ML; MG/5ML
0.35 SOLUTION ORAL DAILY
Qty: 90 TABLET | Refills: 3 | Status: SHIPPED | OUTPATIENT
Start: 2025-07-30

## 2025-07-30 NOTE — PROGRESS NOTES
SUBJECTIVE:                                                      Jeana is a 45 year old  female who presents for annual exam.     Patient's last menstrual period was 2025 (approximate).. Menses are regular q 28-30 days and normal lasting 4 days.  Using oral contraceptives for contraception.  She is not currently considering pregnancy.  Besides routine health maintenance, she has no other health concerns today .  GYNECOLOGIC HISTORY:    Jeana is sexually active with 1 male partner(s) and is currently in a monogamous relationship.      History sexually transmitted infections:No STD history    History of abnormal Pap smear: No - age 30- 64 PAP with HPV every 5 years recommended  Family history of breast CA: No  Family history of uterine/ovarian CA: No    Family history of colon CA: Yes (Please explain): MGF      HISTORY:  OB History    Para Term  AB Living   0 0 0 0 0 0   SAB IAB Ectopic Multiple Live Births   0 0 0 0 0     Past Medical History:   Diagnosis Date    Migraine with aura      Past Surgical History:   Procedure Laterality Date    NO HISTORY OF SURGERY       Family History   Problem Relation Age of Onset    Hypertension Mother     Thyroid nodules Mother     Hypertension Father     Cerebrovascular Disease Maternal Grandmother     Colon Cancer Maternal Grandfather     Diabetes Paternal Grandmother     Cerebrovascular Disease Paternal Grandfather     No Known Problems Brother      Social History     Socioeconomic History    Marital status: Single     Spouse name: None    Number of children: None    Years of education: None    Highest education level: None   Tobacco Use    Smoking status: Never    Smokeless tobacco: Never   Vaping Use    Vaping status: Never Used   Substance and Sexual Activity    Alcohol use: Yes    Drug use: Never    Sexual activity: Yes     Partners: Male     Birth control/protection: Pill   Other Topics Concern    Parent/sibling w/ CABG, MI or angioplasty  before 65F 55M? No     Social Drivers of Health     Financial Resource Strain: Low Risk  (6/12/2025)    Financial Resource Strain     Within the past 12 months, have you or your family members you live with been unable to get utilities (heat, electricity) when it was really needed?: No   Food Insecurity: Low Risk  (6/12/2025)    Food Insecurity     Within the past 12 months, did you worry that your food would run out before you got money to buy more?: No     Within the past 12 months, did the food you bought just not last and you didn t have money to get more?: No   Transportation Needs: Low Risk  (6/12/2025)    Transportation Needs     Within the past 12 months, has lack of transportation kept you from medical appointments, getting your medicines, non-medical meetings or appointments, work, or from getting things that you need?: No   Physical Activity: Sufficiently Active (6/12/2025)    Exercise Vital Sign     Days of Exercise per Week: 4 days     Minutes of Exercise per Session: 50 min   Stress: No Stress Concern Present (6/12/2025)    [employer] [place] [employer] - Occupational Stress Questionnaire     Feeling of Stress : Not at all   Social Connections: Unknown (6/12/2025)    Social Connection and Isolation Panel [NHANES]     Frequency of Social Gatherings with Friends and Family: Patient declined   Interpersonal Safety: Low Risk  (6/12/2025)    Interpersonal Safety     Do you feel physically and emotionally safe where you currently live?: Yes     Within the past 12 months, have you been hit, slapped, kicked or otherwise physically hurt by someone?: No     Within the past 12 months, have you been humiliated or emotionally abused in other ways by your partner or ex-partner?: No   Housing Stability: Low Risk  (6/12/2025)    Housing Stability     Do you have housing? : Yes     Are you worried about losing your housing?: No       Current Outpatient Medications:     norethindrone (MICRONOR) 0.35 MG  "tablet, Take 1 tablet (0.35 mg) by mouth daily., Disp: 90 tablet, Rfl: 3     Allergies   Allergen Reactions    No Known Drug Allergy        Past medical, surgical, social and family history were reviewed and updated in EPIC.    ROS:   12 point review of systems negative other than symptoms noted below.      OBJECTIVE:                                                      EXAM:  /66   Ht 1.816 m (5' 11.5\")   Wt 79.4 kg (175 lb)   LMP 06/29/2025 (Approximate)   BMI 24.07 kg/m     BMI: Body mass index is 24.07 kg/m .  General: Alert and oriented, no distress.  Psychiatric: Mood and affect within normal limits.     Vulva:  No external lesions, normal female hair distribution, no inguinal adenopathy.    Urethra:  Midline, non-tender, well supported, no discharge  Vagina:  Well-estrogenized, no abnormal discharge, no lesions  Cervix: no lesions, no discharge  Uterus:  anteverted, smooth contour, without enlargement, mobile, and without tenderness and anteverted  Ovaries:  No masses appreciated, non-tender, mobile  Rectal Exam: deferred  Musculoskeletal: extremities normal      COUNSELING:   Reviewed preventive health counseling, as reflected in patient instructions   reports that she has never smoked. She has never used smokeless tobacco.        ASSESSMENT/PLAN:                                                      45 year old female with satisfactory annual exam  (Z01.419) Women's annual routine gynecological examination  Comment:Normal annual gynecologic examination.  Plan: norethindrone (MICRONOR) 0.35 MG tablet        Refilled x 1 year.  Patient takes progestin only oral contraceptives due to history of migraines with aura    Mil Calderon MD        "